# Patient Record
Sex: FEMALE | Race: ASIAN | NOT HISPANIC OR LATINO
[De-identification: names, ages, dates, MRNs, and addresses within clinical notes are randomized per-mention and may not be internally consistent; named-entity substitution may affect disease eponyms.]

---

## 2020-10-12 ENCOUNTER — TRANSCRIPTION ENCOUNTER (OUTPATIENT)
Age: 75
End: 2020-10-12

## 2021-05-24 PROBLEM — Z00.00 ENCOUNTER FOR PREVENTIVE HEALTH EXAMINATION: Status: ACTIVE | Noted: 2021-05-24

## 2021-05-26 PROBLEM — R91.8 LUNG MASS: Status: ACTIVE | Noted: 2021-05-26

## 2021-05-27 ENCOUNTER — NON-APPOINTMENT (OUTPATIENT)
Age: 76
End: 2021-05-27

## 2021-05-27 ENCOUNTER — APPOINTMENT (OUTPATIENT)
Dept: THORACIC SURGERY | Facility: CLINIC | Age: 76
End: 2021-05-27

## 2021-05-27 DIAGNOSIS — R91.8 OTHER NONSPECIFIC ABNORMAL FINDING OF LUNG FIELD: ICD-10-CM

## 2021-06-30 ENCOUNTER — NON-APPOINTMENT (OUTPATIENT)
Age: 76
End: 2021-06-30

## 2021-07-15 ENCOUNTER — NON-APPOINTMENT (OUTPATIENT)
Age: 76
End: 2021-07-15

## 2021-07-15 ENCOUNTER — APPOINTMENT (OUTPATIENT)
Dept: THORACIC SURGERY | Facility: CLINIC | Age: 76
End: 2021-07-15
Payer: MEDICAID

## 2021-07-15 VITALS
HEART RATE: 94 BPM | DIASTOLIC BLOOD PRESSURE: 80 MMHG | TEMPERATURE: 98.8 F | BODY MASS INDEX: 50.02 KG/M2 | RESPIRATION RATE: 18 BRPM | WEIGHT: 293 LBS | SYSTOLIC BLOOD PRESSURE: 124 MMHG | HEIGHT: 64 IN | OXYGEN SATURATION: 95 %

## 2021-07-15 DIAGNOSIS — Z87.09 PERSONAL HISTORY OF OTHER DISEASES OF THE RESPIRATORY SYSTEM: ICD-10-CM

## 2021-07-15 DIAGNOSIS — Z80.0 FAMILY HISTORY OF MALIGNANT NEOPLASM OF DIGESTIVE ORGANS: ICD-10-CM

## 2021-07-15 DIAGNOSIS — Z80.1 FAMILY HISTORY OF MALIGNANT NEOPLASM OF TRACHEA, BRONCHUS AND LUNG: ICD-10-CM

## 2021-07-15 DIAGNOSIS — Z87.891 PERSONAL HISTORY OF NICOTINE DEPENDENCE: ICD-10-CM

## 2021-07-15 DIAGNOSIS — R59.0 LOCALIZED ENLARGED LYMPH NODES: ICD-10-CM

## 2021-07-15 DIAGNOSIS — C34.91 MALIGNANT NEOPLASM OF UNSPECIFIED PART OF RIGHT BRONCHUS OR LUNG: ICD-10-CM

## 2021-07-15 PROCEDURE — 99205 OFFICE O/P NEW HI 60 MIN: CPT

## 2021-07-16 PROBLEM — R59.0 MEDIASTINAL ADENOPATHY: Status: ACTIVE | Noted: 2021-07-16

## 2021-07-16 PROBLEM — Z80.1 FAMILY HISTORY OF LUNG CANCER: Status: ACTIVE | Noted: 2021-07-16

## 2021-07-16 PROBLEM — Z87.09 HISTORY OF ASTHMA: Status: RESOLVED | Noted: 2021-07-16 | Resolved: 2021-07-16

## 2021-07-16 PROBLEM — Z87.891 FORMER SMOKER: Status: ACTIVE | Noted: 2021-07-16

## 2021-07-16 PROBLEM — C34.91 ADENOCARCINOMA OF LUNG, RIGHT: Status: ACTIVE | Noted: 2021-07-16

## 2021-07-16 PROBLEM — Z80.0 FAMILY HISTORY OF MALIGNANT NEOPLASM OF COLON: Status: ACTIVE | Noted: 2021-07-16

## 2021-07-16 NOTE — HISTORY OF PRESENT ILLNESS
[FreeTextEntry1] : Ms. BRENNAN BOOKER, 76 year old female, former smoker, w/ hx of asthma, who had COVID in 12/20/20, she was planning to go back to [place], a CXR was done as a part of requirements, which revealed RUL mass-like density, CT chest on 05/20/2021 revealed RUL mass and RLL nodules, s/p CT guided bx of RUL which revealed adenocarcinoma. \par \par CT Chest on 5/20/21:\par - 4 x 3.6 x 5cm spiculated anteromedial RUL mass which abuts the pleural surface anteriorly, the mediastinum medially, and the minor fissure inferiorly\par - several RLL nodules: largest measuring 2.6 (3:127)\par - 0.9 cm precarinal LN (2:44)\par - 2.5 cm Rt thyroid nodule\par \par Patient is s/p FNA of thyroid nodule, path revealed Hashimoto's thyroiditis (as per Dr. Brandon Hayes's note, no path report is available to review at the time of office visit). \par \par Patient is s/p RUL bx on 06/18/2021. Path revealed adenocarcinoma of lung origin. Specimen was sent for molecular testing, however, sufficient DNA quality was not obtained from the submitted specimen.  \par \par Patient was evaluated by Dr. Macho Gil  (Hem/Onc) and Dr. Brandon Hayes (Rad/Onc), who recommended approximately 6000 cGy. \par \par Patient is here today for CT Sx consultation, referred by Dr. Macho Gil (Hem/Onc) for more tissue diagnosis. Patient c/o SOB on exertion, denies cough, chest pain, fever, chills, loss of appetite, weight loss, or hemoptysis.

## 2021-07-16 NOTE — CONSULT LETTER
[FreeTextEntry2] : Dr. Macho Gil (Hem/Onc/Referring)\par Dr. Faizan Rosado (PCP) [FreeTextEntry3] : Som Ingram MD, MPH \par System Director of Thoracic Surgery \par Director of Comprehensive Lung and Foregut Adger \par Professor Cardiovascular & Thoracic Surgery  \par [facility] [employer] [facility]\par \par [facility]\par [address]\par Oncology [address]\[place] [address]\par Tel: [phone]\par Fax: [phone]\par

## 2021-07-16 NOTE — DATA REVIEWED
[FreeTextEntry1] : I have independently reviewed patient's CT on 5/20/21:\par CT Chest on 5/20/21:\par - 4 x 3.6 x 5cm spiculated anteromedial RUL mass which abuts the pleural surface anteriorly, the mediastinum medially, and the minor fissure inferiorly\par - several RLL nodules: largest measuring 2.6 (3:127)\par - 0.9cm precarinal LN (2:44)\par - 2.5cm Rt thyroid nodule

## 2021-07-16 NOTE — PHYSICAL EXAM
[General Appearance - Alert] : alert [General Appearance - In No Acute Distress] : in no acute distress [Sclera] : the sclera and conjunctiva were normal [PERRL With Normal Accommodation] : pupils were equal in size, round, and reactive to light [Extraocular Movements] : extraocular movements were intact [Outer Ear] : the ears and nose were normal in appearance [Oropharynx] : the oropharynx was normal [Neck Appearance] : the appearance of the neck was normal [Neck Cervical Mass (___cm)] : no neck mass was observed [Jugular Venous Distention Increased] : there was no jugular-venous distention [Thyroid Diffuse Enlargement] : the thyroid was not enlarged [Thyroid Nodule] : there were no palpable thyroid nodules [Heart Rate And Rhythm] : heart rate was normal and rhythm regular [Heart Sounds] : normal S1 and S2 [Heart Sounds Gallop] : no gallops [Murmurs] : no murmurs [Heart Sounds Pericardial Friction Rub] : no pericardial rub [Examination Of The Chest] : the chest was normal in appearance [Chest Visual Inspection Thoracic Asymmetry] : no chest asymmetry [Diminished Respiratory Excursion] : normal chest expansion [2+] : left 2+ [No Abnormalities] : the abdominal aorta was not enlarged and no bruit was heard [Breast Appearance] : normal in appearance [Breast Palpation Mass] : no palpable masses [Bowel Sounds] : normal bowel sounds [Abdomen Soft] : soft [Abdomen Tenderness] : non-tender [Abdomen Mass (___ Cm)] : no abdominal mass palpated [Cervical Lymph Nodes Enlarged Posterior Bilaterally] : posterior cervical [Cervical Lymph Nodes Enlarged Anterior Bilaterally] : anterior cervical [Supraclavicular Lymph Nodes Enlarged Bilaterally] : supraclavicular [No CVA Tenderness] : no ~M costovertebral angle tenderness [No Spinal Tenderness] : no spinal tenderness [Abnormal Walk] : normal gait [Nail Clubbing] : no clubbing  or cyanosis of the fingernails [Musculoskeletal - Swelling] : no joint swelling seen [Motor Tone] : muscle strength and tone were normal [Skin Color & Pigmentation] : normal skin color and pigmentation [Skin Turgor] : normal skin turgor [] : no rash [Deep Tendon Reflexes (DTR)] : deep tendon reflexes were 2+ and symmetric [Sensation] : the sensory exam was normal to light touch and pinprick [No Focal Deficits] : no focal deficits [Oriented To Time, Place, And Person] : oriented to person, place, and time [Impaired Insight] : insight and judgment were intact [Affect] : the affect was normal [Right Carotid Bruit] : no bruit heard over the right carotid [Left Carotid Bruit] : no bruit heard over the left carotid [Right Femoral Bruit] : no bruit heard over the right femoral artery [Left Femoral Bruit] : no bruit heard over the left femoral artery [FreeTextEntry1] : Deferred

## 2021-07-16 NOTE — ASSESSMENT
[FreeTextEntry1] : Ms. BRENNAN BOOKER, 76 year old female, former smoker, w/ hx of asthma, who had COVID in 12/20/20, she was planning to go back to [place], a CXR was done as a part of requirements, which revealed RUL mass-like density, CT chest on 05/20/2021 revealed RUL mass and RLL nodules, s/p CT guided bx of RUL which revealed adenocarcinoma. \par \par CT Chest on 5/20/21:\par - 4 x 3.6 x 5cm spiculated anteromedial RUL mass which abuts the pleural surface anteriorly, the mediastinum medially, and the minor fissure inferiorly\par - several RLL nodules: largest measuring 2.6 (3:127)\par - 0.9 cm precarinal LN (2:44)\par - 2.5 cm Rt thyroid nodule\par \par Patient is s/p FNA of thyroid nodule, path revealed Hashimoto's thyroiditis (as per Dr. Brandon Hayes's note, no path report is available to review at the time of office visit). \par \par Patient is s/p RUL bx on 06/18/2021. Path revealed adenocarcinoma of lung origin. Specimen was sent for molecular testing, however, sufficient DNA quality was not obtained from the submitted specimen.  \par \par Patient was evaluated by Dr. Macho Gil  (Hem/Onc) and Dr. Brandon Hayes (Rad/Onc), who recommended approximately 6000 cGy. \par \par I have reviewed the patient's medical records and diagnostic images at time of this office consultation and have made the following recommendation:\par 1. CT chest reviewed and explained to patient, I recommended a Flexible Bronchoscopy, Endobronchial ultrasound biopsy on 07/21/2021. Risks and benefits and alternatives explained to patient, all questions answered, patient agreed to proceed with surgery.\par 2. PET/CT\par 3. MRI of brain w/w/o contrast\par 4. Medical clearance and PST. \par \par \par I personally performed the services described in the documentation, reviewed the documentation recorded by the scribe in my presence and it accurately and completely records my words and actions.\par \par I, Donna Coronado, NP, am scribing for and the presence of RITCHIE Rhodes, the following sections HISTORY OF PRESENT ILLNESS, PAST MEDICAL/FAMILY/SOCIAL HISTORY; REVIEW OF SYSTEMS; VITAL SIGNS; PHYSICAL EXAM; DISPOSITION. \par

## 2021-07-20 ENCOUNTER — NON-APPOINTMENT (OUTPATIENT)
Age: 76
End: 2021-07-20

## 2021-07-21 ENCOUNTER — OUTPATIENT (OUTPATIENT)
Dept: OUTPATIENT SERVICES | Facility: HOSPITAL | Age: 76
LOS: 1 days | Discharge: HOME | End: 2021-07-21
Payer: MEDICAID

## 2021-07-21 ENCOUNTER — RESULT REVIEW (OUTPATIENT)
Age: 76
End: 2021-07-21

## 2021-07-21 ENCOUNTER — APPOINTMENT (OUTPATIENT)
Dept: THORACIC SURGERY | Facility: HOSPITAL | Age: 76
End: 2021-07-21

## 2021-07-21 VITALS
OXYGEN SATURATION: 95 % | DIASTOLIC BLOOD PRESSURE: 58 MMHG | HEART RATE: 79 BPM | SYSTOLIC BLOOD PRESSURE: 103 MMHG | RESPIRATION RATE: 16 BRPM

## 2021-07-21 VITALS
WEIGHT: 113.1 LBS | TEMPERATURE: 99 F | SYSTOLIC BLOOD PRESSURE: 126 MMHG | HEART RATE: 84 BPM | HEIGHT: 61.02 IN | OXYGEN SATURATION: 95 % | RESPIRATION RATE: 16 BRPM | DIASTOLIC BLOOD PRESSURE: 81 MMHG

## 2021-07-21 DIAGNOSIS — Z90.710 ACQUIRED ABSENCE OF BOTH CERVIX AND UTERUS: Chronic | ICD-10-CM

## 2021-07-21 DIAGNOSIS — C34.11 MALIGNANT NEOPLASM OF UPPER LOBE, RIGHT BRONCHUS OR LUNG: ICD-10-CM

## 2021-07-21 DIAGNOSIS — R59.0 LOCALIZED ENLARGED LYMPH NODES: ICD-10-CM

## 2021-07-21 PROCEDURE — 88305 TISSUE EXAM BY PATHOLOGIST: CPT | Mod: 26

## 2021-07-21 PROCEDURE — 88112 CYTOPATH CELL ENHANCE TECH: CPT | Mod: 26,59

## 2021-07-21 PROCEDURE — 88341 IMHCHEM/IMCYTCHM EA ADD ANTB: CPT | Mod: 26

## 2021-07-21 PROCEDURE — 31645 BRNCHSC W/THER ASPIR 1ST: CPT

## 2021-07-21 PROCEDURE — 31623 DX BRONCHOSCOPE/BRUSH: CPT

## 2021-07-21 PROCEDURE — 31625 BRONCHOSCOPY W/BIOPSY(S): CPT | Mod: 59

## 2021-07-21 PROCEDURE — 31652 BRONCH EBUS SAMPLNG 1/2 NODE: CPT

## 2021-07-21 PROCEDURE — 31624 DX BRONCHOSCOPE/LAVAGE: CPT

## 2021-07-21 PROCEDURE — 71045 X-RAY EXAM CHEST 1 VIEW: CPT | Mod: 26

## 2021-07-21 PROCEDURE — 88173 CYTOPATH EVAL FNA REPORT: CPT | Mod: 26

## 2021-07-21 PROCEDURE — 88342 IMHCHEM/IMCYTCHM 1ST ANTB: CPT | Mod: 26

## 2021-07-21 PROCEDURE — 31629 BRONCHOSCOPY/NEEDLE BX EACH: CPT

## 2021-07-21 RX ORDER — SODIUM CHLORIDE 9 MG/ML
1000 INJECTION, SOLUTION INTRAVENOUS
Refills: 0 | Status: DISCONTINUED | OUTPATIENT
Start: 2021-07-21 | End: 2021-08-05

## 2021-07-21 RX ORDER — FENTANYL CITRATE 50 UG/ML
25 INJECTION INTRAVENOUS
Refills: 0 | Status: DISCONTINUED | OUTPATIENT
Start: 2021-07-21 | End: 2021-07-21

## 2021-07-21 RX ORDER — IPRATROPIUM/ALBUTEROL SULFATE 18-103MCG
3 AEROSOL WITH ADAPTER (GRAM) INHALATION EVERY 6 HOURS
Refills: 0 | Status: DISCONTINUED | OUTPATIENT
Start: 2021-07-21 | End: 2021-08-05

## 2021-07-21 RX ORDER — ACETAMINOPHEN 500 MG
1000 TABLET ORAL ONCE
Refills: 0 | Status: DISCONTINUED | OUTPATIENT
Start: 2021-07-21 | End: 2021-08-05

## 2021-07-21 RX ADMIN — Medication 3 MILLILITER(S): at 18:21

## 2021-07-21 NOTE — H&P ADULT - HISTORY OF PRESENT ILLNESS
CT Chest on 5/20/21:  - 4 x 3.6 x 5cm spiculated anteromedial RUL mass which abuts the pleural surface anteriorly, the mediastinum medially, and the minor fissure inferiorly  - several RLL nodules: largest measuring 2.6 (3:127)  - 0.9 cm precarinal LN (2:44)  - 2.5 cm Rt thyroid nodule    Patient is s/p FNA of thyroid nodule, path revealed Hashimoto's thyroiditis (as per Dr. Brandon Hayes's note, no path report is available to review at the time of office visit).     Patient is s/p RUL bx on 06/18/2021. Path revealed adenocarcinoma of lung origin. Specimen was sent for molecular testing, however, sufficient DNA quality was not obtained from the submitted specimen.     Patient was evaluated by Dr. Macho Gil (Hem/Onc) and Dr. Brandon Hayes (Rad/Onc), who recommended approximately 6000 cGy.     Patient is here today for CT Sx consultation, referred by Dr. Macho Gil (Hem/Onc) for more tissue diagnosis. Patient c/o SOB on exertion, denies cough, chest pain, fever, chills, loss of appetite, weight loss, or hemoptysis.   Pt is here today for EBUS    Ms. BRENNAN BOOKER, 76 year old female, former smoker, w/ hx of asthma, who had COVID in 12/20/20, she was planning to go back to [place], a CXR was done as a part of requirements, which revealed RUL mass-like density, CT chest on 05/20/2021 revealed RUL mass and RLL nodules, s/p CT guided bx of RUL which revealed adenocarcinoma.

## 2021-07-21 NOTE — H&P ADULT - NSHPSOCIALHISTORY_GEN_ALL_CORE
Surgical History  History of Hysterectomy    Family History  Family history of lung cancer (V16.1) (Z80.1) : Brother  Family history of malignant neoplasm of colon (V16.0) (Z80.0) : Sister    Social History  Former smoker (V15.82) (Z87.891)   · 2-3 cig/day for 3 years, quit ~ 2000  No alcohol use  Retired from employment

## 2021-07-21 NOTE — H&P ADULT - NSHPPHYSICALEXAM_GEN_ALL_CORE
Constitutional: alert and in no acute distress.   Eyes: the sclera and conjunctiva were normal, pupils were equal in size, round, and reactive to light and extraocular movements were intact.   ENT: the ears and nose were normal in appearance . the oropharynx was normal.   Neck: the appearance of the neck was normal, no neck mass was observed, the thyroid was not enlarged and there were no palpable thyroid nodules . there was no jugular-venous distention.   Heart: heart rate was normal and rhythm regular, normal S1 and S2, no gallops, no murmurs and no pericardial rub.   Chest: the chest was normal in appearance, no chest asymmetry and normal chest expansion.   Vascular: Carotid: right 2+, left 2+, no bruit heard over the right carotid and no bruit heard over the left carotid. Brachial: right 2+ and left 2+. Radial: right 2+ and left 2+. Femoral: no bruit heard over the right femoral artery and no bruit heard over the left femoral artery.   Abdominal Aorta: the abdominal aorta was not enlarged and no bruit was heard.   Breasts: normal in appearance and no palpable masses.   Abdomen: normal bowel sounds, soft, non-tender, no hepato-splenomegaly and no abdominal mass palpated.   Genitourinary:. Deferred.   Lymphatics: The posterior cervical, anterior cervical and supraclavicular nodes were non-tender and normal size.   Back: no costovertebral angle tenderness and no spinal tenderness.   Musculoskeletal: normal gait, no clubbing or cyanosis of the fingernails, no joint swelling seen and muscle strength and tone were normal.   Skin: normal skin color and pigmentation, normal skin turgor and no rash.   Neurological: deep tendon reflexes were 2+ and symmetric, the sensory exam was normal to light touch and pinprick and no focal deficits.   Psychiatric: oriented to person, place, and time, insight and judgment were intact and the affect was normal.

## 2021-07-21 NOTE — BRIEF OPERATIVE NOTE - NSICDXBRIEFPROCEDURE_GEN_ALL_CORE_FT
PROCEDURES:  Bronchoscopy, with EBUS and bronchoalveolar lavage 21-Jul-2021 15:45:25  Shruti Galarza   PROCEDURES:  Bronchoscopy, with EBUS and bronchoalveolar lavage 21-Jul-2021 15:45:25 TBNA R4, RUL endobronchial brushing, biopsy and BAL Shruti Galarza

## 2021-07-21 NOTE — ASU DISCHARGE PLAN (ADULT/PEDIATRIC) - CARE PROVIDER_API CALL
Som Ingram (MD)  Surgery; Thoracic Surgery  [phone] [place] [address]  Phone: [phone]  Fax: [phone]  Follow Up Time:

## 2021-07-21 NOTE — ASU DISCHARGE PLAN (ADULT/PEDIATRIC) - CALL YOUR DOCTOR IF YOU HAVE ANY OF THE FOLLOWING:
shortness of breath, it is usual to cough up small amounts of blood tinged sputum for a day or two, call Dr Ingram if you become short of breath/Bleeding that does not stop/Swelling that gets worse/Pain not relieved by Medications shortness of breath, it is usual to cough up small amounts of blood tinged sputum for a day or two, call Dr Ingram if you become short of breath/Bleeding that does not stop/Swelling that gets worse/Pain not relieved by Medications/Wound/Surgical Site with redness, or foul smelling discharge or pus/Nausea and vomiting that does not stop

## 2021-07-27 LAB — NON-GYNECOLOGICAL CYTOLOGY STUDY: SIGNIFICANT CHANGE UP

## 2021-07-29 ENCOUNTER — NON-APPOINTMENT (OUTPATIENT)
Age: 76
End: 2021-07-29

## 2023-03-02 NOTE — ASU PREOP CHECKLIST - LATEX ALLERGY
Patient notified of providers comments for Lab results and comments for c/o of continued bleeding and mild cramping, voiced understanding and gave thank you.   no